# Patient Record
Sex: FEMALE | Race: WHITE | Employment: FULL TIME | URBAN - NONMETROPOLITAN AREA
[De-identification: names, ages, dates, MRNs, and addresses within clinical notes are randomized per-mention and may not be internally consistent; named-entity substitution may affect disease eponyms.]

---

## 2017-03-01 ENCOUNTER — HOSPITAL ENCOUNTER (OUTPATIENT)
Dept: CT IMAGING | Age: 37
Discharge: HOME | End: 2017-03-01
Payer: COMMERCIAL

## 2017-03-01 DIAGNOSIS — R59.1 LYMPHADENOPATHY: ICD-10-CM

## 2017-03-01 PROCEDURE — 70492 CT SFT TSUE NCK W/O & W/DYE: CPT

## 2017-03-01 PROCEDURE — 6360000004 HC RX CONTRAST MEDICATION: Performed by: NURSE PRACTITIONER

## 2017-03-01 RX ADMIN — IOVERSOL 75 ML: 741 INJECTION INTRA-ARTERIAL; INTRAVENOUS at 12:14

## 2017-03-27 ENCOUNTER — OFFICE VISIT (OUTPATIENT)
Dept: OTOLARYNGOLOGY | Age: 37
End: 2017-03-27
Payer: COMMERCIAL

## 2017-03-27 VITALS
DIASTOLIC BLOOD PRESSURE: 70 MMHG | OXYGEN SATURATION: 98 % | HEART RATE: 75 BPM | WEIGHT: 175 LBS | BODY MASS INDEX: 31.01 KG/M2 | HEIGHT: 63 IN | SYSTOLIC BLOOD PRESSURE: 130 MMHG | RESPIRATION RATE: 20 BRPM

## 2017-03-27 DIAGNOSIS — H92.01 OTALGIA OF RIGHT EAR: ICD-10-CM

## 2017-03-27 DIAGNOSIS — R59.9 LYMPH NODE ENLARGEMENT: Primary | ICD-10-CM

## 2017-03-27 PROCEDURE — 99203 OFFICE O/P NEW LOW 30 MIN: CPT | Performed by: OTOLARYNGOLOGY

## 2017-03-27 PROCEDURE — 92504 EAR MICROSCOPY EXAMINATION: CPT | Performed by: OTOLARYNGOLOGY

## 2017-03-27 RX ORDER — OMEPRAZOLE 20 MG/1
CAPSULE, DELAYED RELEASE ORAL
COMMUNITY
Start: 2017-03-07 | End: 2017-08-08 | Stop reason: SDUPTHER

## 2017-03-27 RX ORDER — NORGESTIMATE AND ETHINYL ESTRADIOL 0.25-0.035
KIT ORAL
COMMUNITY
Start: 2017-03-24 | End: 2018-01-15 | Stop reason: SDUPTHER

## 2017-03-27 RX ORDER — AMLODIPINE BESYLATE 5 MG/1
TABLET ORAL
COMMUNITY
Start: 2017-03-17 | End: 2017-08-08 | Stop reason: SDUPTHER

## 2017-03-27 ASSESSMENT — ENCOUNTER SYMPTOMS
RESPIRATORY NEGATIVE: 1
GASTROINTESTINAL NEGATIVE: 1
ALLERGIC/IMMUNOLOGIC NEGATIVE: 1
EYES NEGATIVE: 1

## 2017-05-22 ENCOUNTER — HOSPITAL ENCOUNTER (OUTPATIENT)
Dept: WOMENS IMAGING | Age: 37
Discharge: HOME | End: 2017-05-22
Payer: COMMERCIAL

## 2017-05-22 DIAGNOSIS — Z12.31 VISIT FOR SCREENING MAMMOGRAM: ICD-10-CM

## 2017-05-22 PROCEDURE — 77063 BREAST TOMOSYNTHESIS BI: CPT

## 2017-07-13 ENCOUNTER — OFFICE VISIT (OUTPATIENT)
Dept: INTERNAL MEDICINE | Age: 37
End: 2017-07-13
Payer: COMMERCIAL

## 2017-07-13 VITALS
HEART RATE: 63 BPM | HEIGHT: 63 IN | OXYGEN SATURATION: 98 % | WEIGHT: 175 LBS | DIASTOLIC BLOOD PRESSURE: 68 MMHG | BODY MASS INDEX: 31.01 KG/M2 | SYSTOLIC BLOOD PRESSURE: 112 MMHG

## 2017-07-13 DIAGNOSIS — I10 ESSENTIAL HYPERTENSION: ICD-10-CM

## 2017-07-13 DIAGNOSIS — N92.6 IRREGULAR MENSES: ICD-10-CM

## 2017-07-13 PROBLEM — F34.1 DYSTHYMIA: Status: ACTIVE | Noted: 2017-07-13

## 2017-07-13 PROBLEM — F34.1 DYSTHYMIA: Status: RESOLVED | Noted: 2017-07-13 | Resolved: 2017-07-13

## 2017-07-13 PROBLEM — F32.A DEPRESSION: Status: ACTIVE | Noted: 2017-07-13

## 2017-07-13 PROCEDURE — 99213 OFFICE O/P EST LOW 20 MIN: CPT | Performed by: INTERNAL MEDICINE

## 2017-07-13 RX ORDER — ACYCLOVIR 200 MG/1
200 CAPSULE ORAL 2 TIMES DAILY
COMMUNITY
End: 2017-08-08 | Stop reason: SDUPTHER

## 2017-07-13 ASSESSMENT — ENCOUNTER SYMPTOMS
SHORTNESS OF BREATH: 0
EYE REDNESS: 0
ABDOMINAL PAIN: 0
RHINORRHEA: 0
COUGH: 0
SINUS PRESSURE: 0
VOMITING: 0
DIARRHEA: 0
CONSTIPATION: 0
NAUSEA: 0

## 2017-08-09 RX ORDER — AMLODIPINE BESYLATE 5 MG/1
5 TABLET ORAL DAILY
Qty: 90 TABLET | Refills: 3 | Status: SHIPPED | OUTPATIENT
Start: 2017-08-09 | End: 2018-12-20 | Stop reason: SDUPTHER

## 2017-08-09 RX ORDER — ACYCLOVIR 200 MG/1
200 CAPSULE ORAL 2 TIMES DAILY
Qty: 180 CAPSULE | Refills: 3 | Status: SHIPPED | OUTPATIENT
Start: 2017-08-09 | End: 2018-08-28 | Stop reason: SDUPTHER

## 2017-08-09 RX ORDER — OMEPRAZOLE 20 MG/1
20 CAPSULE, DELAYED RELEASE ORAL DAILY
Qty: 90 CAPSULE | Refills: 3 | Status: SHIPPED | OUTPATIENT
Start: 2017-08-09 | End: 2018-08-04 | Stop reason: SDUPTHER

## 2017-12-27 ENCOUNTER — OFFICE VISIT (OUTPATIENT)
Dept: URGENT CARE | Age: 37
End: 2017-12-27
Payer: COMMERCIAL

## 2017-12-27 VITALS
SYSTOLIC BLOOD PRESSURE: 142 MMHG | HEIGHT: 63 IN | HEART RATE: 95 BPM | RESPIRATION RATE: 20 BRPM | DIASTOLIC BLOOD PRESSURE: 88 MMHG | TEMPERATURE: 98.5 F | OXYGEN SATURATION: 99 % | BODY MASS INDEX: 31.89 KG/M2 | WEIGHT: 180 LBS

## 2017-12-27 DIAGNOSIS — H93.8X3 EAR PRESSURE, BILATERAL: ICD-10-CM

## 2017-12-27 DIAGNOSIS — J06.9 VIRAL URI: Primary | ICD-10-CM

## 2017-12-27 PROCEDURE — 99213 OFFICE O/P EST LOW 20 MIN: CPT | Performed by: NURSE PRACTITIONER

## 2017-12-27 PROCEDURE — G8417 CALC BMI ABV UP PARAM F/U: HCPCS | Performed by: NURSE PRACTITIONER

## 2017-12-27 PROCEDURE — G8484 FLU IMMUNIZE NO ADMIN: HCPCS | Performed by: NURSE PRACTITIONER

## 2017-12-27 PROCEDURE — 1036F TOBACCO NON-USER: CPT | Performed by: NURSE PRACTITIONER

## 2017-12-27 PROCEDURE — G8427 DOCREV CUR MEDS BY ELIG CLIN: HCPCS | Performed by: NURSE PRACTITIONER

## 2017-12-27 RX ORDER — CETIRIZINE HYDROCHLORIDE 10 MG/1
10 TABLET ORAL DAILY
Qty: 30 TABLET | Refills: 1 | Status: SHIPPED | OUTPATIENT
Start: 2017-12-27 | End: 2018-01-15 | Stop reason: ALTCHOICE

## 2017-12-27 RX ORDER — FLUTICASONE PROPIONATE 50 MCG
1 SPRAY, SUSPENSION (ML) NASAL DAILY
Qty: 1 BOTTLE | Refills: 1 | Status: SHIPPED | OUTPATIENT
Start: 2017-12-27 | End: 2018-01-15 | Stop reason: ALTCHOICE

## 2017-12-27 ASSESSMENT — ENCOUNTER SYMPTOMS
COUGH: 0
SORE THROAT: 0

## 2017-12-27 NOTE — PROGRESS NOTES
[address] [facility]  [address] [facility] [address]  Dept: [phone]  Loc: [phone]      Christy Cortez is c/o of Nasal Congestion and Ear Fullness (ear pressure)        HPI:     HPI  Patient presents with sinus congestion, ear pressure. Symptoms started two days ago and have worsened. She has taken mucinex fast max. She has not had any fever. Past Medical History:   Diagnosis Date    Dysthymia 7/13/2017    Herpes genitalis     Hypertension     Irregular menses 7/13/2017      Past Surgical History:   Procedure Laterality Date    LYMPHADENECTOMY         Family History   Problem Relation Age of Onset    Diabetes Father     Coronary Art Dis Father     Hypertension Father     Cancer Other        Social History   Substance Use Topics    Smoking status: Never Smoker    Smokeless tobacco: Never Used    Alcohol use Yes      Comment: one drink a week       Current Outpatient Prescriptions   Medication Sig Dispense Refill    fluticasone (FLONASE) 50 MCG/ACT nasal spray 1 spray by Nasal route daily 1 Bottle 1    cetirizine (ZYRTEC ALLERGY) 10 MG tablet Take 1 tablet by mouth daily 30 tablet 1    acyclovir (ZOVIRAX) 200 MG capsule Take 1 capsule by mouth 2 times daily 180 capsule 3    amLODIPine (NORVASC) 5 MG tablet Take 1 tablet by mouth daily 90 tablet 3    omeprazole (PRILOSEC) 20 MG delayed release capsule Take 1 capsule by mouth Daily 90 capsule 3    MONONESSA 0.25-35 MG-MCG per tablet        No current facility-administered medications for this visit. Allergies   Allergen Reactions    Penicillins        Health Maintenance   Topic Date Due    HIV screen  07/21/1993    DTaP/Tdap/Td vaccine (1 - Tdap) 07/21/1997    Cervical cancer screen  07/21/1999    Flu vaccine (1) 09/01/2017       Subjective:      Review of Systems   Constitutional: Negative for fever. HENT: Positive for congestion and ear pain. Negative for sore throat. information. 1. Begin Flonase and Zyrtec daily  2.  Rest and increase fluid intake        Electronically signed by SOLOMON Sorensen on 12/27/2017 at 10:40 AM

## 2018-01-15 ENCOUNTER — OFFICE VISIT (OUTPATIENT)
Dept: INTERNAL MEDICINE | Age: 38
End: 2018-01-15
Payer: COMMERCIAL

## 2018-01-15 VITALS
WEIGHT: 178 LBS | BODY MASS INDEX: 31.54 KG/M2 | HEART RATE: 70 BPM | DIASTOLIC BLOOD PRESSURE: 78 MMHG | HEIGHT: 63 IN | SYSTOLIC BLOOD PRESSURE: 120 MMHG | OXYGEN SATURATION: 99 %

## 2018-01-15 DIAGNOSIS — Z12.4 PAP SMEAR FOR CERVICAL CANCER SCREENING: ICD-10-CM

## 2018-01-15 DIAGNOSIS — M25.512 ACUTE PAIN OF LEFT SHOULDER: ICD-10-CM

## 2018-01-15 DIAGNOSIS — Z00.00 ANNUAL PHYSICAL EXAM: Primary | ICD-10-CM

## 2018-01-15 DIAGNOSIS — I10 ESSENTIAL HYPERTENSION: ICD-10-CM

## 2018-01-15 PROCEDURE — 99395 PREV VISIT EST AGE 18-39: CPT | Performed by: INTERNAL MEDICINE

## 2018-01-15 RX ORDER — NORGESTIMATE AND ETHINYL ESTRADIOL 0.25-0.035
1 KIT ORAL DAILY
Qty: 3 PACKET | Refills: 3 | Status: SHIPPED | OUTPATIENT
Start: 2018-01-15 | End: 2018-10-26 | Stop reason: SDUPTHER

## 2018-01-15 ASSESSMENT — ENCOUNTER SYMPTOMS
COUGH: 0
EYE REDNESS: 0
SHORTNESS OF BREATH: 0
ABDOMINAL DISTENTION: 0
BACK PAIN: 0
ABDOMINAL PAIN: 0
EYE DISCHARGE: 0
SINUS PRESSURE: 0

## 2018-01-15 ASSESSMENT — PATIENT HEALTH QUESTIONNAIRE - PHQ9
1. LITTLE INTEREST OR PLEASURE IN DOING THINGS: 0
SUM OF ALL RESPONSES TO PHQ9 QUESTIONS 1 & 2: 0
2. FEELING DOWN, DEPRESSED OR HOPELESS: 0
SUM OF ALL RESPONSES TO PHQ QUESTIONS 1-9: 0

## 2018-01-15 NOTE — PROGRESS NOTES
Visit Information    Have you changed or started any medications since your last visit including any over-the-counter medicines, vitamins, or herbal medicines? no   Are you having any side effects from any of your medications? -  no  Have you stopped taking any of your medications? Is so, why? -  no    Have you seen any other physician or provider since your last visit? No  Have you had any other diagnostic tests since your last visit? No  Have you been seen in the emergency room and/or had an admission to a hospital since we last saw you? No  Have you had your routine dental cleaning in the past 6 months? yes - dentist    Have you activated your [employer] account? If not, what are your barriers?  No: later     Patient Care Team:  Mary Ann Wood MD as PCP - General (Internal Medicine)    Medical History Review  Past Medical, Family, and Social History reviewed and does not contribute to the patient presenting condition    Health Maintenance   Topic Date Due    Potassium monitoring  1978    Creatinine monitoring  1978    HIV screen  07/21/1993    DTaP/Tdap/Td vaccine (1 - Tdap) 07/21/1997    Cervical cancer screen  07/21/1999    Flu vaccine (1) 09/01/2017

## 2018-01-15 NOTE — PROGRESS NOTES
Chief Complaint   Patient presents with    Annual Exam       HPI: Pt is here today for annual exam and to f/u hypertension. She is feeling well and feels much better with depression not depressed at all currently and energy level is better. She's exercising routinely eating healthy. She has started having some left shoulder pain here in the past few months pain especially if she moves her arm out to the side. Pain is in the shoulder joint also hurts if she rolls over on her shoulder in the night. No chest pressure no dyspnea no abdominal pain. Mood is good. BP is good when she checks it. Past Medical History:   Diagnosis Date    Dysthymia 7/13/2017    Herpes genitalis     Hypertension     Irregular menses 7/13/2017       Past Surgical History:   Procedure Laterality Date    LYMPHADENECTOMY         Family History   Problem Relation Age of Onset    Diabetes Father     Coronary Art Dis Father     Hypertension Father     Cancer Other        Social History     Social History    Marital status:      Spouse name: N/A    Number of children: N/A    Years of education: N/A     Occupational History    Not on file.      Social History Main Topics    Smoking status: Never Smoker    Smokeless tobacco: Never Used    Alcohol use Yes      Comment: one drink a week     Drug use: Unknown    Sexual activity: Not on file     Other Topics Concern    Not on file     Social History Narrative    No narrative on file       Allergies   Allergen Reactions    Penicillins        Current Outpatient Prescriptions   Medication Sig Dispense Refill    MONONESSA 0.25-35 MG-MCG per tablet Take 1 tablet by mouth daily 3 packet 3    acyclovir (ZOVIRAX) 200 MG capsule Take 1 capsule by mouth 2 times daily 180 capsule 3    amLODIPine (NORVASC) 5 MG tablet Take 1 tablet by mouth daily 90 tablet 3    omeprazole (PRILOSEC) 20 MG delayed release capsule Take 1 capsule by mouth Daily 90 capsule 3     No current

## 2018-04-11 PROBLEM — Z12.4 PAP SMEAR FOR CERVICAL CANCER SCREENING: Status: RESOLVED | Noted: 2018-01-15 | Resolved: 2018-04-11

## 2018-04-11 PROBLEM — Z00.00 ANNUAL PHYSICAL EXAM: Status: RESOLVED | Noted: 2018-01-15 | Resolved: 2018-04-11

## 2018-05-24 ENCOUNTER — HOSPITAL ENCOUNTER (OUTPATIENT)
Dept: WOMENS IMAGING | Age: 38
Discharge: HOME | End: 2018-05-24
Payer: COMMERCIAL

## 2018-05-24 DIAGNOSIS — Z12.31 ENCOUNTER FOR SCREENING MAMMOGRAM FOR MALIGNANT NEOPLASM OF BREAST: ICD-10-CM

## 2018-05-24 PROCEDURE — 77067 SCR MAMMO BI INCL CAD: CPT

## 2018-07-16 ENCOUNTER — OFFICE VISIT (OUTPATIENT)
Dept: INTERNAL MEDICINE | Age: 38
End: 2018-07-16
Payer: COMMERCIAL

## 2018-07-16 VITALS
HEART RATE: 100 BPM | WEIGHT: 175 LBS | BODY MASS INDEX: 31.01 KG/M2 | HEIGHT: 63 IN | OXYGEN SATURATION: 98 % | DIASTOLIC BLOOD PRESSURE: 78 MMHG | SYSTOLIC BLOOD PRESSURE: 126 MMHG

## 2018-07-16 DIAGNOSIS — I10 ESSENTIAL HYPERTENSION: ICD-10-CM

## 2018-07-16 DIAGNOSIS — M75.122 COMPLETE TEAR OF LEFT ROTATOR CUFF: Primary | ICD-10-CM

## 2018-07-16 PROBLEM — F32.A DEPRESSION: Status: RESOLVED | Noted: 2017-07-13 | Resolved: 2018-07-16

## 2018-07-16 PROCEDURE — G8417 CALC BMI ABV UP PARAM F/U: HCPCS | Performed by: INTERNAL MEDICINE

## 2018-07-16 PROCEDURE — 99213 OFFICE O/P EST LOW 20 MIN: CPT | Performed by: INTERNAL MEDICINE

## 2018-07-16 PROCEDURE — G8427 DOCREV CUR MEDS BY ELIG CLIN: HCPCS | Performed by: INTERNAL MEDICINE

## 2018-07-16 PROCEDURE — 1036F TOBACCO NON-USER: CPT | Performed by: INTERNAL MEDICINE

## 2018-07-16 ASSESSMENT — ENCOUNTER SYMPTOMS
ABDOMINAL PAIN: 0
ABDOMINAL DISTENTION: 0
SHORTNESS OF BREATH: 0

## 2018-07-16 NOTE — PROGRESS NOTES
Chief Complaint   Patient presents with    6 Month Follow-Up     had recent left rotator cuff surgery per Dr. Crow Wynne Hypertension    Gastroesophageal Reflux       HPI:Patient is here for six-month follow-up since I last saw her she had surgery for rotator cuff tear. She has recently got out of her sling and she pretty much has almost no range of motion of her left shoulder she's having a lot of pain she says almost similar to before surgery. 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 No recurrent injury no neck pain no chest pain or dyspnea. Pt had surgery for left rotator cuff tear and impingement. Still with some shoulder pain and with difficulty. Mood is good she's engaged to get . Not able to exercise his had some weight gain. Difficulty sleeping is has to prop her shoulder up. Past Medical History:   Diagnosis Date    Dysthymia 7/13/2017    Herpes genitalis     Hypertension     Irregular menses 7/13/2017       Past Surgical History:   Procedure Laterality Date    LYMPHADENECTOMY         Family History   Problem Relation Age of Onset    Diabetes Father     Coronary Art Dis Father     Hypertension Father     Cancer Other        Social History     Social History    Marital status:      Spouse name: N/A    Number of children: N/A    Years of education: N/A     Occupational History    Not on file.      Social History Main Topics    Smoking status: Never Smoker    Smokeless tobacco: Never Used    Alcohol use Yes      Comment: one drink a week     Drug use: Unknown    Sexual activity: Not on file     Other Topics Concern    Not on file     Social History Narrative    No narrative on file       Allergies   Allergen Reactions    Penicillins        Current Outpatient Prescriptions   Medication Sig Dispense Refill    MONONESSA 0.25-35 MG-MCG per tablet Take 1 tablet by mouth daily 3 packet 3    acyclovir (ZOVIRAX) 200 MG capsule Take 1 capsule by mouth 2 times daily 180 capsule 3    amLODIPine (NORVASC) 5 MG tablet Take 1 tablet by mouth daily 90 tablet 3    omeprazole (PRILOSEC) 20 MG delayed release capsule Take 1 capsule by mouth Daily 90 capsule 3     No current facility-administered medications for this visit. Review of Systems   Constitutional: Negative for fatigue. Respiratory: Negative for shortness of breath. Cardiovascular: Negative for chest pain and leg swelling. Gastrointestinal: Negative for abdominal distention and abdominal pain. Endocrine:        Weight gain. Musculoskeletal: Positive for arthralgias. Psychiatric/Behavioral: Positive for sleep disturbance. Negative for dysphoric mood. The patient is not nervous/anxious. Discouraged       /78 (Site: Right Arm)   Pulse 100   Ht 5' 3\" (1.6 m)   Wt 175 lb (79.4 kg)   SpO2 98%   BMI 31.00 kg/m²     Physical Exam  Minimal range of motion of the left shoulder with active range of motion with passive range of motion she still has very limited rom. Neck is Supple but muscles across left upper neck/ back are tight.  sclera anicteric heart S1-S2. Lungs are clear. No results found for this or any previous visit. ASSESSMENT/ PLAN:  1. Complete tear of left rotator cuff  Patient needs to see her orthopedist again to let him know of her difficulty at current and see what he recommends helpfully ongoing physical therapy will be the answer and she is going 3 times a week that she and I would feel better if Dr. Tenisha Urias could see her sooner. Especially with her going back to work her job will require more range of motion of her left shoulder most likely. - Yung Abreu MD    2. Essential hypertension  Stable and follow  - CBC; Future  - Comprehensive Metabolic Panel; Future  - TSH without Reflex; Future  - Lipid Panel; Future  - Hemoglobin A1C; Future  - Vitamin D 25 Hydroxy;  Future    Depression is resolved

## 2018-08-06 RX ORDER — OMEPRAZOLE 20 MG/1
20 CAPSULE, DELAYED RELEASE ORAL DAILY
Qty: 90 CAPSULE | Refills: 3 | Status: SHIPPED | OUTPATIENT
Start: 2018-08-06 | End: 2019-06-28 | Stop reason: SDUPTHER

## 2018-08-28 RX ORDER — ACYCLOVIR 200 MG/1
CAPSULE ORAL
Qty: 180 CAPSULE | Refills: 1 | Status: SHIPPED | OUTPATIENT
Start: 2018-08-28 | End: 2019-05-01 | Stop reason: SDUPTHER

## 2018-09-26 ENCOUNTER — TELEPHONE (OUTPATIENT)
Dept: INTERNAL MEDICINE | Age: 38
End: 2018-09-26

## 2018-09-26 PROBLEM — E66.9 CLASS 1 OBESITY IN ADULT: Status: ACTIVE | Noted: 2018-09-26

## 2018-10-26 DIAGNOSIS — Z00.00 ANNUAL PHYSICAL EXAM: ICD-10-CM

## 2018-10-26 RX ORDER — NORGESTIMATE AND ETHINYL ESTRADIOL 0.25-0.035
KIT ORAL
Qty: 28 TABLET | Refills: 11 | Status: SHIPPED | OUTPATIENT
Start: 2018-10-26 | End: 2019-07-29 | Stop reason: SDUPTHER

## 2018-12-20 RX ORDER — AMLODIPINE BESYLATE 5 MG/1
5 TABLET ORAL DAILY
Qty: 90 TABLET | Refills: 3 | Status: SHIPPED | OUTPATIENT
Start: 2018-12-20

## 2019-02-21 ENCOUNTER — OFFICE VISIT (OUTPATIENT)
Dept: INTERNAL MEDICINE | Age: 39
End: 2019-02-21
Payer: COMMERCIAL

## 2019-02-21 VITALS
HEART RATE: 64 BPM | BODY MASS INDEX: 31.36 KG/M2 | DIASTOLIC BLOOD PRESSURE: 72 MMHG | SYSTOLIC BLOOD PRESSURE: 120 MMHG | WEIGHT: 177 LBS | HEIGHT: 63 IN

## 2019-02-21 DIAGNOSIS — Z12.4 PAP SMEAR FOR CERVICAL CANCER SCREENING: ICD-10-CM

## 2019-02-21 DIAGNOSIS — K64.9 BLEEDING HEMORRHOID: ICD-10-CM

## 2019-02-21 DIAGNOSIS — Z12.31 SCREENING MAMMOGRAM, ENCOUNTER FOR: ICD-10-CM

## 2019-02-21 DIAGNOSIS — B37.9 YEAST INFECTION: ICD-10-CM

## 2019-02-21 DIAGNOSIS — Z00.00 ANNUAL PHYSICAL EXAM: Primary | Chronic | ICD-10-CM

## 2019-02-21 DIAGNOSIS — I10 ESSENTIAL HYPERTENSION: ICD-10-CM

## 2019-02-21 LAB — PAP SMEAR: NORMAL

## 2019-02-21 PROCEDURE — 99396 PREV VISIT EST AGE 40-64: CPT | Performed by: INTERNAL MEDICINE

## 2019-02-21 RX ORDER — FLUCONAZOLE 200 MG/1
200 TABLET ORAL DAILY
Qty: 7 TABLET | Refills: 0 | Status: SHIPPED | OUTPATIENT
Start: 2019-02-21 | End: 2019-02-21

## 2019-02-21 RX ORDER — FLUCONAZOLE 200 MG/1
200 TABLET ORAL DAILY
Qty: 7 TABLET | Refills: 0 | Status: SHIPPED | OUTPATIENT
Start: 2019-02-21 | End: 2019-02-28

## 2019-02-21 RX ORDER — HYDROCORTISONE ACETATE 25 MG/1
25 SUPPOSITORY RECTAL EVERY 12 HOURS
Qty: 12 SUPPOSITORY | Refills: 1 | Status: SHIPPED | OUTPATIENT
Start: 2019-02-21

## 2019-02-21 RX ORDER — FLUCONAZOLE 100 MG/1
200 TABLET ORAL DAILY
Qty: 14 TABLET | Refills: 0 | Status: SHIPPED | OUTPATIENT
Start: 2019-02-21 | End: 2019-02-21

## 2019-02-21 ASSESSMENT — PATIENT HEALTH QUESTIONNAIRE - PHQ9
SUM OF ALL RESPONSES TO PHQ QUESTIONS 1-9: 0
SUM OF ALL RESPONSES TO PHQ QUESTIONS 1-9: 0
1. LITTLE INTEREST OR PLEASURE IN DOING THINGS: 0
SUM OF ALL RESPONSES TO PHQ9 QUESTIONS 1 & 2: 0
2. FEELING DOWN, DEPRESSED OR HOPELESS: 0

## 2019-02-21 ASSESSMENT — ENCOUNTER SYMPTOMS
BACK PAIN: 0
COUGH: 0
ABDOMINAL DISTENTION: 0
SINUS PRESSURE: 0
EYE DISCHARGE: 0
SHORTNESS OF BREATH: 0
ABDOMINAL PAIN: 0
EYE REDNESS: 0
RECTAL PAIN: 1

## 2019-03-03 PROBLEM — Z00.00 ANNUAL PHYSICAL EXAM: Chronic | Status: ACTIVE | Noted: 2018-01-15

## 2019-03-13 DIAGNOSIS — B97.7 HPV IN FEMALE: Primary | ICD-10-CM

## 2019-04-02 ENCOUNTER — OFFICE VISIT (OUTPATIENT)
Dept: OBGYN | Age: 39
End: 2019-04-02
Payer: COMMERCIAL

## 2019-04-02 VITALS
SYSTOLIC BLOOD PRESSURE: 145 MMHG | HEART RATE: 86 BPM | BODY MASS INDEX: 30.83 KG/M2 | HEIGHT: 63 IN | DIASTOLIC BLOOD PRESSURE: 96 MMHG | WEIGHT: 174 LBS

## 2019-04-02 DIAGNOSIS — R87.810 CERVICAL HIGH RISK HPV (HUMAN PAPILLOMAVIRUS) TEST POSITIVE: ICD-10-CM

## 2019-04-02 DIAGNOSIS — K64.9 HEMORRHOIDS, UNSPECIFIED HEMORRHOID TYPE: ICD-10-CM

## 2019-04-02 DIAGNOSIS — Z76.89 ENCOUNTER TO ESTABLISH CARE WITH NEW DOCTOR: Primary | ICD-10-CM

## 2019-04-02 PROBLEM — Z00.00 ANNUAL PHYSICAL EXAM: Chronic | Status: RESOLVED | Noted: 2018-01-15 | Resolved: 2019-04-02

## 2019-04-02 PROCEDURE — 57452 EXAM OF CERVIX W/SCOPE: CPT | Performed by: ADVANCED PRACTICE MIDWIFE

## 2019-04-02 PROCEDURE — 99243 OFF/OP CNSLTJ NEW/EST LOW 30: CPT | Performed by: ADVANCED PRACTICE MIDWIFE

## 2019-04-02 RX ORDER — PHENTERMINE HYDROCHLORIDE 37.5 MG/1
37.5 CAPSULE ORAL EVERY MORNING
COMMUNITY

## 2019-04-02 ASSESSMENT — ENCOUNTER SYMPTOMS
GASTROINTESTINAL NEGATIVE: 1
RESPIRATORY NEGATIVE: 1
ALLERGIC/IMMUNOLOGIC NEGATIVE: 1
EYES NEGATIVE: 1

## 2019-04-02 NOTE — PROGRESS NOTES
Eleanor Navarro is a 34 y.o. female who presents today for her medical conditions/ complaints as noted below. Chief Complaint   Patient presents with    New Patient    Procedure     colpo; HPV positive    Other     terrible hemrrhoid         HPI  Jacobo Read presents for colposcopy after her PAP results showed Negative cytology and +HPV, not #16 or #18. She is asymptomatic. She is a patient of Dr. Muriel Harada and was sent for GYN consultation. Patient's last menstrual period was 03/15/2019 (exact date). No obstetric history on file. Past Medical History:   Diagnosis Date    Bleeding hemorrhoid 2/21/2019    Class 1 obesity in adult 9/26/2018    Dysthymia 7/13/2017    Herpes genitalis     Hypertension     Irregular menses 7/13/2017     Past Surgical History:   Procedure Laterality Date    LYMPHADENECTOMY          Family History   Problem Relation Age of Onset    Diabetes Father     Coronary Art Dis Father     Hypertension Father     Cancer Other      Social History     Tobacco Use    Smoking status: Never Smoker    Smokeless tobacco: Never Used   Substance Use Topics    Alcohol use: Yes     Comment: one drink a week        Current Outpatient Medications   Medication Sig Dispense Refill    phentermine 37.5 MG capsule Take 37.5 mg by mouth every morning.  hydrocortisone (ANUSOL-HC) 2.5 % rectal cream Place rectally 2 times daily and prn post bm 45 g 1    hydrocortisone (ANUSOL-HC) 25 MG suppository Place 1 suppository rectally every 12 hours And prn post bm 12 suppository 1    amLODIPine (NORVASC) 5 MG tablet TAKE 1 TABLET BY MOUTH  DAILY 90 tablet 3    MONONESSA 0.25-35 MG-MCG per tablet TAKE 1 TABLET BY MOUTH ONCE DAILY. 28 tablet 11    acyclovir (ZOVIRAX) 200 MG capsule TAKE 1 CAPSULE BY MOUTH TWO TIMES DAILY 180 capsule 1    omeprazole (PRILOSEC) 20 MG delayed release capsule TAKE 1 CAPSULE BY MOUTH  DAILY 90 capsule 3     No current facility-administered medications for this visit. Allergies   Allergen Reactions    Penicillins      Vitals:    04/02/19 1529   BP: (!) 145/96   Pulse: 86     Body mass index is 30.82 kg/m². Review of Systems   Constitutional: Negative. HENT: Negative. Eyes: Negative. Respiratory: Negative. Cardiovascular: Negative. Gastrointestinal: Negative. Endocrine: Negative. Genitourinary: Negative. Musculoskeletal: Negative. Skin: Negative. Allergic/Immunologic: Negative. Neurological: Negative. Hematological: Negative. Psychiatric/Behavioral: Negative. Physical Exam   Constitutional: She is oriented to person, place, and time. She appears well-developed and well-nourished. HENT:   Head: Normocephalic and atraumatic. Eyes: Pupils are equal, round, and reactive to light. Conjunctivae are normal.   Neck: Normal range of motion. Pulmonary/Chest: Effort normal.   Genitourinary: Vagina normal and uterus normal. Cervix exhibits no motion tenderness, no discharge and no friability. Right adnexum displays no mass, no tenderness and no fullness. Left adnexum displays no mass, no tenderness and no fullness. Genitourinary Comments: EGBUS normal. Vulva reveals no erythema, lesions or atrophic changes. Vagina normal with normal rugae no lesions, polyps or discharge. Cervix appears normal without lesions. Musculoskeletal: Normal range of motion. Neurological: She is alert and oriented to person, place, and time. Skin: Skin is warm and dry. Psychiatric: She has a normal mood and affect. Nursing note and vitals reviewed. Colposcopy Procedure Note    Indications: Pap smear 1 months ago showed: Neg PAP +HPV. The prior pap showed no abnormalities. Prior cervical/vaginal disease: normal exam without visible pathology. Prior cervical treatment: n/a. Procedure Details   The risks and benefits of the procedure and Written informed consent obtained.     Speculum placed in vagina and excellent visualization of cervix achieved, cervix swabbed x 3 with acetic acid solution. Findings:  Cervix: no visible lesions, no mosaicism, no punctation and no abnormal vasculature; SCJ visualized 360 degrees without lesions and no biopsies taken. Vaginal inspection: vaginal colposcopy not performed. Vulvar colposcopy: vulvar colposcopy not performed. Specimens: None    Complications: none. Plan:  PAP negative + HPV - According to ASCCP guidelines, a colposcopy is not indicated. Due to her negative HPV 16 & 18, the recommendation is to repeat cotesting in 1year. I stressed the importance of compliance. 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 A colposcopy was performed per pt request and no abnormality was noted. No biopsy was taken. I recommend repeat cotesting in 1 year. Diagnosis Orders   1. Encounter to establish care with new doctor     2. Cervical high risk HPV (human papillomavirus) test positive  93149 - [place] COLPOSC,CERVIX W/ADJ VAG,W/BX & CURRETAG       MEDICATIONS:  No orders of the defined types were placed in this encounter. ORDERS:  No orders of the defined types were placed in this encounter. Hemorrhoids - Rectal rocke rx to [facility] pharmacy was sent. She is to f/u with Dr. Elizabeth Franz.

## 2019-04-02 NOTE — PATIENT INSTRUCTIONS
Patient Education        Colposcopy: What to Expect at [address] may feel some soreness in your vagina for a day or two if you had a biopsy. Some vaginal bleeding or discharge is normal for up to a week after a biopsy. The discharge may be dark-colored if a solution was put on your cervix. You can use a sanitary pad for the bleeding. It may take a week or two for you to get the test results. This care sheet gives you a general idea about how long it will take for you to recover. But each person recovers at a different pace. Follow the steps below to feel better as quickly as possible. How can you care for yourself at home? Activity    · You can return to work and most daily activities right after the test.   Exercise    · Do not exercise for 1 day after the test.   Medicines    · Your doctor will tell you if and when you can restart your medicines. He or she will also give you instructions about taking any new medicines.     · If you take blood thinners, such as warfarin (Coumadin), clopidogrel (Plavix), or aspirin, be sure to talk to your doctor. He or she will tell you if and when to start taking those medicines again. Make sure that you understand exactly what your doctor wants you to do.     · Take an over-the-counter pain medicine, such as acetaminophen (Tylenol), ibuprofen (Advil, Motrin), or naproxen (Aleve). Be safe with medicines. Read and follow all instructions on the label. Do not take two or more pain medicines at the same time unless the doctor told you to. Many pain medicines have acetaminophen, which is Tylenol. Too much acetaminophen (Tylenol) can be harmful. Other instructions    · Use a pad if you have some bleeding.     · Do not douche, have sexual intercourse, or use tampons for 1 week if you had a biopsy. This will allow time for your cervix to heal.     · You can take a bath or shower anytime after the test.   Follow-up care is a key part of your treatment and safety.  Be

## 2019-05-01 RX ORDER — ACYCLOVIR 200 MG/1
CAPSULE ORAL
Qty: 180 CAPSULE | Refills: 1 | Status: SHIPPED | OUTPATIENT
Start: 2019-05-01 | End: 2019-11-14 | Stop reason: SDUPTHER

## 2019-05-28 ENCOUNTER — HOSPITAL ENCOUNTER (OUTPATIENT)
Dept: WOMENS IMAGING | Age: 39
Discharge: HOME | End: 2019-05-28
Payer: COMMERCIAL

## 2019-05-28 DIAGNOSIS — Z12.31 SCREENING MAMMOGRAM, ENCOUNTER FOR: ICD-10-CM

## 2019-05-28 PROCEDURE — 77067 SCR MAMMO BI INCL CAD: CPT

## 2019-07-01 RX ORDER — OMEPRAZOLE 20 MG/1
20 CAPSULE, DELAYED RELEASE ORAL DAILY
Qty: 90 CAPSULE | Refills: 3 | Status: SHIPPED | OUTPATIENT
Start: 2019-07-01

## 2019-07-29 DIAGNOSIS — Z00.00 ANNUAL PHYSICAL EXAM: ICD-10-CM

## 2019-07-29 RX ORDER — NORGESTIMATE AND ETHINYL ESTRADIOL
KIT
Qty: 28 TABLET | Refills: 3 | Status: SHIPPED | OUTPATIENT
Start: 2019-07-29 | End: 2019-10-29 | Stop reason: SDUPTHER

## 2019-10-29 DIAGNOSIS — Z00.00 ANNUAL PHYSICAL EXAM: ICD-10-CM

## 2019-10-29 RX ORDER — NORGESTIMATE AND ETHINYL ESTRADIOL
KIT
Qty: 28 TABLET | Refills: 0 | Status: SHIPPED | OUTPATIENT
Start: 2019-10-29 | End: 2019-11-22 | Stop reason: SDUPTHER

## 2019-11-15 RX ORDER — ACYCLOVIR 200 MG/1
CAPSULE ORAL
Qty: 180 CAPSULE | Refills: 1 | Status: SHIPPED | OUTPATIENT
Start: 2019-11-15

## 2019-11-22 DIAGNOSIS — Z00.00 ANNUAL PHYSICAL EXAM: ICD-10-CM

## 2019-11-22 RX ORDER — NORGESTIMATE AND ETHINYL ESTRADIOL
KIT
Qty: 28 TABLET | Refills: 3 | Status: SHIPPED | OUTPATIENT
Start: 2019-11-22